# Patient Record
Sex: MALE | Race: WHITE | Employment: STUDENT | URBAN - NONMETROPOLITAN AREA
[De-identification: names, ages, dates, MRNs, and addresses within clinical notes are randomized per-mention and may not be internally consistent; named-entity substitution may affect disease eponyms.]

---
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
## 2024-03-14 ENCOUNTER — OFFICE VISIT (OUTPATIENT)
Dept: FAMILY MEDICINE CLINIC | Age: 19
End: 2024-03-14
Payer: COMMERCIAL

## 2024-03-14 VITALS
WEIGHT: 174.9 LBS | OXYGEN SATURATION: 97 % | BODY MASS INDEX: 25.1 KG/M2 | DIASTOLIC BLOOD PRESSURE: 70 MMHG | TEMPERATURE: 98.4 F | HEART RATE: 70 BPM | SYSTOLIC BLOOD PRESSURE: 122 MMHG

## 2024-03-14 DIAGNOSIS — L30.9 ECZEMA, UNSPECIFIED TYPE: Primary | ICD-10-CM

## 2024-03-14 DIAGNOSIS — D22.9 MULTIPLE PIGMENTED NEVI: ICD-10-CM

## 2024-03-14 PROCEDURE — G8427 DOCREV CUR MEDS BY ELIG CLIN: HCPCS | Performed by: FAMILY MEDICINE

## 2024-03-14 PROCEDURE — 99213 OFFICE O/P EST LOW 20 MIN: CPT | Performed by: FAMILY MEDICINE

## 2024-03-14 PROCEDURE — G8419 CALC BMI OUT NRM PARAM NOF/U: HCPCS | Performed by: FAMILY MEDICINE

## 2024-03-14 PROCEDURE — G8484 FLU IMMUNIZE NO ADMIN: HCPCS | Performed by: FAMILY MEDICINE

## 2024-03-14 PROCEDURE — 1036F TOBACCO NON-USER: CPT | Performed by: FAMILY MEDICINE

## 2024-03-14 RX ORDER — MOMETASONE FUROATE 1 MG/G
OINTMENT TOPICAL
Qty: 45 G | Refills: 2 | Status: SHIPPED | OUTPATIENT
Start: 2024-03-14

## 2024-03-14 SDOH — ECONOMIC STABILITY: FOOD INSECURITY: WITHIN THE PAST 12 MONTHS, YOU WORRIED THAT YOUR FOOD WOULD RUN OUT BEFORE YOU GOT MONEY TO BUY MORE.: NEVER TRUE

## 2024-03-14 SDOH — ECONOMIC STABILITY: HOUSING INSECURITY
IN THE LAST 12 MONTHS, WAS THERE A TIME WHEN YOU DID NOT HAVE A STEADY PLACE TO SLEEP OR SLEPT IN A SHELTER (INCLUDING NOW)?: NO

## 2024-03-14 SDOH — ECONOMIC STABILITY: INCOME INSECURITY: HOW HARD IS IT FOR YOU TO PAY FOR THE VERY BASICS LIKE FOOD, HOUSING, MEDICAL CARE, AND HEATING?: NOT HARD AT ALL

## 2024-03-14 SDOH — ECONOMIC STABILITY: FOOD INSECURITY: WITHIN THE PAST 12 MONTHS, THE FOOD YOU BOUGHT JUST DIDN'T LAST AND YOU DIDN'T HAVE MONEY TO GET MORE.: NEVER TRUE

## 2024-03-14 ASSESSMENT — PATIENT HEALTH QUESTIONNAIRE - PHQ9
SUM OF ALL RESPONSES TO PHQ QUESTIONS 1-9: 0
SUM OF ALL RESPONSES TO PHQ9 QUESTIONS 1 & 2: 0
SUM OF ALL RESPONSES TO PHQ QUESTIONS 1-9: 0
SUM OF ALL RESPONSES TO PHQ QUESTIONS 1-9: 0
1. LITTLE INTEREST OR PLEASURE IN DOING THINGS: 0
SUM OF ALL RESPONSES TO PHQ QUESTIONS 1-9: 0
2. FEELING DOWN, DEPRESSED OR HOPELESS: 0

## 2024-03-16 ASSESSMENT — ENCOUNTER SYMPTOMS
SINUS PRESSURE: 0
ABDOMINAL PAIN: 0
EYE PAIN: 0
RHINORRHEA: 0
ABDOMINAL DISTENTION: 0
CONSTIPATION: 0
NAUSEA: 0
COUGH: 0
DIARRHEA: 0
SORE THROAT: 0
SHORTNESS OF BREATH: 0

## 2024-03-16 NOTE — PROGRESS NOTES
[facility]  [facility]  [address] [address]  Dept: [phone]  Dept Fax: [phone]  Loc: [phone]  PROGRESS NOTE      VisitDate: 3/14/2024    Marietta Solis is a 18 y.o. male who presents today for:  Chief Complaint   Patient presents with    Rash     Ring like rash that began 2.5-3 weeks ago. Started as one spot that he thought was dry skin, week ago spots showed up on his shoulder. They are itchy but not painful; has tried antifungal cream with no relief.        Impression/Plan:  1. Eczema, unspecified type    2. Multiple pigmented nevi      Requested Prescriptions     Signed Prescriptions Disp Refills    mometasone (ELOCON) 0.1 % ointment 45 g 2     Sig: Apply topically 2-3 x daily.     No orders of the defined types were placed in this encounter.        Subjective:  HPI  Patient comes in rash on her arms been red dry itchy, no moisture or discharge.  Located on the upper extremity.  Also has some moles she wants checked    Review of Systems   Constitutional:  Negative for appetite change and fever.   HENT:  Negative for congestion, ear pain, postnasal drip, rhinorrhea, sinus pressure and sore throat.    Eyes:  Negative for pain and visual disturbance.   Respiratory:  Negative for cough and shortness of breath.    Cardiovascular:  Negative for chest pain.   Gastrointestinal:  Negative for abdominal distention, abdominal pain, constipation, diarrhea and nausea.   Genitourinary:  Negative for dysuria, frequency and urgency.   Musculoskeletal:  Negative for arthralgias.   Skin:  Positive for rash.   Neurological:  Negative for dizziness.     History reviewed. No pertinent past medical history.   History reviewed. No pertinent surgical history.  Family History   Problem Relation Age of Onset    Arthritis Mother     Allergy (Severe) Father         Gluten-Celiac Disease    Arthritis Father     Gout Father         Sporadic    Cancer Maternal